# Patient Record
Sex: FEMALE | Race: WHITE | Employment: FULL TIME | URBAN - METROPOLITAN AREA
[De-identification: names, ages, dates, MRNs, and addresses within clinical notes are randomized per-mention and may not be internally consistent; named-entity substitution may affect disease eponyms.]

---

## 2018-09-20 ENCOUNTER — HOSPITAL ENCOUNTER (OUTPATIENT)
Dept: LAB | Age: 23
Discharge: HOME | End: 2018-09-20
Payer: COMMERCIAL

## 2018-09-20 LAB — TSH SERPL DL<=0.005 MIU/L-ACNC: 3.13 UIU/ML

## 2018-09-20 PROCEDURE — 80323 ALKALOIDS NOS: CPT

## 2018-09-20 PROCEDURE — 84443 ASSAY THYROID STIM HORMONE: CPT

## 2018-09-20 PROCEDURE — 36415 COLL VENOUS BLD VENIPUNCTURE: CPT

## 2018-09-20 PROCEDURE — 82652 VIT D 1 25-DIHYDROXY: CPT

## 2018-09-21 LAB — 1,25(OH)2D3 SERPL-MCNC: 51.1 PG/ML (ref 19.9–79.3)

## 2018-09-23 LAB
COTININE SERPL-MCNC: NORMAL NG/ML
NICOTINE SERPL-MCNC: NORMAL NG/ML

## 2018-11-30 ENCOUNTER — HOSPITAL ENCOUNTER (OUTPATIENT)
Dept: SURGERY | Age: 23
Discharge: HOME | End: 2018-11-30
Attending: SURGERY
Payer: COMMERCIAL

## 2018-11-30 VITALS
WEIGHT: 252.2 LBS | HEIGHT: 62 IN | TEMPERATURE: 98 F | RESPIRATION RATE: 16 BRPM | OXYGEN SATURATION: 97 % | HEART RATE: 76 BPM | BODY MASS INDEX: 46.41 KG/M2 | SYSTOLIC BLOOD PRESSURE: 146 MMHG | DIASTOLIC BLOOD PRESSURE: 86 MMHG

## 2018-11-30 LAB
ALBUMIN SERPL-MCNC: 3.6 G/DL (ref 3.5–5)
ALBUMIN/GLOB SERPL: 0.9 {RATIO}
ALP SERPL-CCNC: 73 U/L (ref 50–136)
ALT SERPL-CCNC: 34 U/L (ref 12–65)
ANION GAP SERPL CALC-SCNC: 9 MMOL/L
AST SERPL-CCNC: 22 U/L (ref 15–37)
BILIRUB SERPL-MCNC: 0.3 MG/DL (ref 0.2–1.1)
BUN SERPL-MCNC: 11 MG/DL (ref 6–23)
CALCIUM SERPL-MCNC: 8.9 MG/DL (ref 8.3–10.4)
CHLORIDE SERPL-SCNC: 106 MMOL/L (ref 98–107)
CO2 SERPL-SCNC: 24 MMOL/L (ref 21–32)
CREAT SERPL-MCNC: 0.75 MG/DL (ref 0.6–1)
ERYTHROCYTE [DISTWIDTH] IN BLOOD BY AUTOMATED COUNT: 12.5 % (ref 11.9–14.6)
EST. AVERAGE GLUCOSE BLD GHB EST-MCNC: 105 MG/DL
GLOBULIN SER CALC-MCNC: 4.1 G/DL (ref 2.3–3.5)
GLUCOSE SERPL-MCNC: 86 MG/DL (ref 65–100)
HBA1C MFR BLD: 5.3 %
HCT VFR BLD AUTO: 39.8 % (ref 35.8–46.3)
HGB BLD-MCNC: 12.6 G/DL (ref 11.7–15.4)
MCH RBC QN AUTO: 28.6 PG (ref 26.1–32.9)
MCHC RBC AUTO-ENTMCNC: 31.7 G/DL (ref 31.4–35)
MCV RBC AUTO: 90.5 FL (ref 79.6–97.8)
NRBC # BLD: 0 K/UL (ref 0–0.2)
PLATELET # BLD AUTO: 384 K/UL (ref 150–450)
PMV BLD AUTO: 10 FL (ref 9.4–12.3)
POTASSIUM SERPL-SCNC: 4.2 MMOL/L (ref 3.5–5.1)
PROT SERPL-MCNC: 7.7 G/DL
RBC # BLD AUTO: 4.4 M/UL (ref 4.05–5.2)
SODIUM SERPL-SCNC: 139 MMOL/L (ref 136–145)
WBC # BLD AUTO: 5.9 K/UL (ref 4.3–11.1)

## 2018-11-30 PROCEDURE — 80053 COMPREHEN METABOLIC PANEL: CPT

## 2018-11-30 PROCEDURE — 85027 COMPLETE CBC AUTOMATED: CPT

## 2018-11-30 PROCEDURE — 83036 HEMOGLOBIN GLYCOSYLATED A1C: CPT

## 2018-11-30 NOTE — PERIOP NOTES
Patient verified name and  Order for consent  found in EHR and matches case posting; patient verified. Type 2 surgery, PAT assessment complete. Labs per surgeon: cbc,cmp,hgb a1c; results pending. Labs per anesthesia protocol: no other labs needed. EKG:  Not done per anesthesia guidelines. Hibiclens and instructions given per hospital policy. Patient provided with and instructed on educational handouts including Guide to Surgery, Pain Management, Hand Hygiene, Blood Transfusion Education, and [place] [facility] Brochure. Patient answered medical/surgical history questions at their best of ability. All prior to admission medications documented in [facility]. Original medication prescription bottle not visualized during patient appointment. Patient instructed to hold all vitamins 7 days prior to surgery and NSAIDS 5 days prior to surgery, patient verbalized understanding. Medications to be held: : none. Patient instructed to continue previous medications as prescribed prior to surgery and to take the following medications the day of surgery according to anesthesia guidelines with a small sip of water: sprintec; use proair inhaler and bring. Gi Elias Patient teach back successful and patient demonstrates knowledge of instructions.

## 2018-12-08 NOTE — H&P
Valri Cockayne, MD  
Physician General Surgery Progress Notes     
Signed Encounter Date:  12/8/18 []Hide copied text []Joy for details History and Physical 
  
Patient: Shaggy Robins MRN: [id number]  SSN:  YOB: 1995  Age: 24 y.o. Sex: female   
  
  
     
PCP: Lance, Not On File Date:  12/8/18 
  
  
  
Shaggy Robins returns to the [facility] after successful completion of our multidisciplinary surgical prep program.  This is her final consultation preparing her for Sleeve Gastrectomy surgery. She presents with a height of 5' 2.5\" (1.588 m) and weight of 254 lb (115.2 kg), giving her a Body mass index is 45.72 kg/m². She has an Ideal body weight of 136 lbs, and excess body weight of 118 lbs. She started our program with a weight of 254 lbs. 
  
She has completed all aspects of our prep program and has been deemed an acceptable candidate for bariatric surgery. 
  
PSYCHOLOGICAL EVALUATION:   Completed with Renee Pineda deeming her and appropriate surgical candidate. 
  
DIETITIAN EVALUATOIN:  Completed with Radha Guallpa deeming her an appropriate surgical candidate. 
  
BARIATRIC LABS:   
  
Component Latest Ref Rng & Units 9/20/2018 Nicotine 
    ng/mL None detected Cotinine 
    ng/mL None detected TSH 
    uIU/mL 3.130 Calcitriol (Vit D 1, 25 di-OH) 19.9 - 79.3 pg/mL 51.1  
  
  
We have reviewed the procedure again today and completed our standard pre op teaching. Her questions were answered and she is ready to schedule surgery. 
  
We have discussed the procedure, diet and exercise regimens, and potential complications of surgery. The patient understands the nature and potential complication of surgery and has the capacity to follow the post operative diet, exercise, and nutritional requirements. After review, she has signed her surgical consent today.   
  
Medical History: Morbid Obesity History of stroke, age 10 Exercise induced asthma  
  
PRIOR WEIGHT LOSS ATTEMPTS:  Dietitian, reduced calorie diets, MD supervised 
  EXERCISE ASSESSMENT:  Walking and jogging 30-45 min 3-4x/week 
  
DENTAL ISSUES:  No dental issues with chewing  
  
PSYCHOSOCIAL: 
She notes she  is single and states main support person is her mom.  She is employed as a pharmacy tech.  Her goal in pursuing surgical weight loss is to improve health. She denies any mental health disorders. She states she is independent in her care, can drive a car, and can ambulate without assistance. 
  
  
HISTORY: 
    
Past Medical History:  
Diagnosis Date  Anxiety    
 ASD (atrial septal defect)    
 Fatigue    
 Headache    
 Morbid obesity ([address]    
 Stroke ([facility] [address] 2001  Swelling of both upper extremities    
  during exercise  
  
  
She denies personal or family history of problems with anesthesia, bleeding. History of a stroke age 10. She denies history of DVT or pulmonary embolism. She denies reflux or dysphagia. 
  
  
     
Past Surgical History:  
Procedure Laterality Date  HX HEENT   2002  
  T&A [facility]  
 HX OTHER SURGICAL   11/01/2001  
  ASD Occluder/Dr. [facility]/[facility]/  
  
Social History  
  
    
Tobacco Use  Smoking status: Never Smoker  Smokeless tobacco: Never Used Substance Use Topics  Alcohol use: No  
 Drug use: No  
  
Family History Problem Relation Age of Onset  Diabetes Mother    
 Hypertension Mother    
 Diabetes Father    
 Hypertension Father    
  
  
MEDICATIONS: 
    
Current Outpatient Medications Medication Sig  
 albuterol (PROAIR HFA) 90 mcg/actuation inhaler Take  by inhalation every four (4) hours as needed for Wheezing.  norgestimate-ethinyl estradiol (SPRINTEC, 28,) 0.25-35 mg-mcg tab Take 1 Tab by mouth daily.  pantoprazole (PROTONIX) 40 mg tablet Take 1 Tab by mouth daily. Indications: gastroesophageal reflux disease  ondansetron (ZOFRAN ODT) 8 mg disintegrating tablet Take 1 Tab by mouth every eight (8) hours as needed for Nausea.  oxyCODONE-acetaminophen (PERCOCET) 5-325 mg per tablet Take 1 Tab by mouth every four (4) hours as needed for Pain (1-2 tablets every 4 hours as needed for pain). Max Daily Amount: 6 Tabs.  
  
No current facility-administered medications for this visit.   
  
  
ALLERGIES: 
No Known Allergies 
  
ROS: The patient has no difficulty with chest pain or shortness of breath. No fever or chills. Comprehensive 13 point review of systems was otherwise unremarkable except as noted above. 
  
PHYSICAL EXAM:  
Visit Vitals BP (!) 157/91 Pulse 90 Ht 5' 2.5\" (1.588 m) Wt 254 lb (115.2 kg) BMI 45.72 kg/m²  
  
  
General: Alert oriented cooperative white female in no acute distress. Eyes: Sclera are clear. Conjunctiva and lids within normal limits. No icterus. Ears and Nose: no gross deformities to visual inspection, gross hearing intact Neck: Supple, trachea midline, no appreciable thyromegaly Resp: No JVD. Breathing is  non-labored. Lungs clear to auscultation without wheezing or rhonchi CV: RRR. No murmurs, rubs or gallops appreciated, Carotid bruits are absent Abd: soft non-tender and non-distended without peritoneal signs. +bs Psych:  Mood and affect appropriate. Short-term memory and understanding intact 
  
  
ASSESSMENT: 
Morbid obesity with a  Body mass index is 45.72 kg/m². ready for a sleeve gastrectomy surgery. 
  
PLAN: 
1.  Schedule for laparoscopic, possible open, Sleeve Gastrectomy, in the near future. 2.  Patient will complete our 2 week pre operative diet. 3.  Bring incentive spirometer( given with our standard instruction to use BID 2 weeks prior to surgery) to hospital on day of surgery. 4.  The patient received prescription to use after surgery for :  Percocet, Zofran, and Protonix. 
  
I went over the risks and benefits with the patient.  For risks I went over problems with bleeding, infection and anesthesia. I also noted potential problems of injury to the esophagus, liver, spleen, stomach, pancreas, small bowel and or colon. I addition, I discussed potential problems of DVT/pulmonary embolism, urinary tract infection, wound infection, myocardial infarction, stroke and the potential need to convert to an open procedure. I quoted a 1% nationwide mortality rate for this procedure. She has signed our extensive consent form which is in the chart.  
Nicolás Redman MD 
  
Date:  12/8/18 
  
  
 
  
  
Electronically signed by Rosanna Sanchez MD at 12/8/18 Note Details Hailey Saez MD File Time 12/8/18 Author Type Physician Status Signed Last  Rosanna Sanchez MD Specialty General Surgery 12/8/18 Revision History Detailed Report Note shared with patient

## 2018-12-10 ENCOUNTER — ANESTHESIA (OUTPATIENT)
Dept: SURGERY | Age: 23
DRG: 621 | End: 2018-12-10
Payer: COMMERCIAL

## 2018-12-10 ENCOUNTER — ANESTHESIA EVENT (OUTPATIENT)
Dept: SURGERY | Age: 23
DRG: 621 | End: 2018-12-10
Payer: COMMERCIAL

## 2018-12-10 ENCOUNTER — HOSPITAL ENCOUNTER (INPATIENT)
Age: 23
LOS: 1 days | Discharge: HOME | DRG: 621 | End: 2018-12-11
Attending: SURGERY | Admitting: SURGERY
Payer: COMMERCIAL

## 2018-12-10 PROBLEM — E66.01 MORBID OBESITY WITH BMI OF 45.0-49.9, ADULT (HCC): Status: ACTIVE | Noted: 2018-12-10

## 2018-12-10 LAB
ANION GAP SERPL CALC-SCNC: 12 MMOL/L
BUN SERPL-MCNC: 7 MG/DL (ref 6–23)
CALCIUM SERPL-MCNC: 8.5 MG/DL (ref 8.3–10.4)
CHLORIDE SERPL-SCNC: 104 MMOL/L (ref 98–107)
CO2 SERPL-SCNC: 21 MMOL/L (ref 21–32)
CREAT SERPL-MCNC: 0.66 MG/DL (ref 0.6–1)
ERYTHROCYTE [DISTWIDTH] IN BLOOD BY AUTOMATED COUNT: 12.2 % (ref 11.9–14.6)
GLUCOSE SERPL-MCNC: 118 MG/DL (ref 65–100)
HCG UR QL: NEGATIVE
HCT VFR BLD AUTO: 39 % (ref 35.8–46.3)
HGB BLD-MCNC: 12.8 G/DL (ref 11.7–15.4)
MAGNESIUM SERPL-MCNC: 2 MG/DL (ref 1.8–2.4)
MCH RBC QN AUTO: 28.6 PG (ref 26.1–32.9)
MCHC RBC AUTO-ENTMCNC: 32.8 G/DL (ref 31.4–35)
MCV RBC AUTO: 87.2 FL (ref 79.6–97.8)
NRBC # BLD: 0 K/UL (ref 0–0.2)
PLATELET # BLD AUTO: 392 K/UL (ref 150–450)
PMV BLD AUTO: 10.6 FL (ref 9.4–12.3)
POTASSIUM SERPL-SCNC: 4 MMOL/L (ref 3.5–5.1)
RBC # BLD AUTO: 4.47 M/UL (ref 4.05–5.2)
SODIUM SERPL-SCNC: 137 MMOL/L (ref 136–145)
TSH SERPL DL<=0.005 MIU/L-ACNC: 0.7 UIU/ML
WBC # BLD AUTO: 13.7 K/UL (ref 4.3–11.1)

## 2018-12-10 PROCEDURE — 77030039266 HC ADH SKN EXOFIN S2SG -A: Performed by: SURGERY

## 2018-12-10 PROCEDURE — 77030009968 HC RELD STPLR ENDOSC J&J -D: Performed by: SURGERY

## 2018-12-10 PROCEDURE — 83735 ASSAY OF MAGNESIUM: CPT

## 2018-12-10 PROCEDURE — 77030007956 HC PCH ENDOSC SPEC COVD -C: Performed by: SURGERY

## 2018-12-10 PROCEDURE — 76060000033 HC ANESTHESIA 1 TO 1.5 HR: Performed by: SURGERY

## 2018-12-10 PROCEDURE — 74011250636 HC RX REV CODE- 250/636: Performed by: ANESTHESIOLOGY

## 2018-12-10 PROCEDURE — 76010000161 HC OR TIME 1 TO 1.5 HR INTENSV-TIER 1: Performed by: SURGERY

## 2018-12-10 PROCEDURE — 0DB64Z3 EXCISION OF STOMACH, PERCUTANEOUS ENDOSCOPIC APPROACH, VERTICAL: ICD-10-PCS | Performed by: SURGERY

## 2018-12-10 PROCEDURE — 77030035048 HC TRCR ENDOSC OPTCL COVD -B: Performed by: SURGERY

## 2018-12-10 PROCEDURE — 77030002933 HC SUT MCRYL J&J -A: Performed by: SURGERY

## 2018-12-10 PROCEDURE — 77030013140 HC MSK NEB VYRM -A

## 2018-12-10 PROCEDURE — 74011250636 HC RX REV CODE- 250/636: Performed by: SURGERY

## 2018-12-10 PROCEDURE — 85027 COMPLETE CBC AUTOMATED: CPT

## 2018-12-10 PROCEDURE — 77030008522 HC TBNG INSUF LAPRO STRY -B: Performed by: SURGERY

## 2018-12-10 PROCEDURE — 80048 BASIC METABOLIC PNL TOTAL CA: CPT

## 2018-12-10 PROCEDURE — 77030035051: Performed by: SURGERY

## 2018-12-10 PROCEDURE — 77030037088 HC TUBE ENDOTRACH ORAL NSL COVD-A: Performed by: ANESTHESIOLOGY

## 2018-12-10 PROCEDURE — 77030034208 HC SLV GASTRCTMY 3D CAL SYS DISP BOEH -C: Performed by: SURGERY

## 2018-12-10 PROCEDURE — 74011250637 HC RX REV CODE- 250/637: Performed by: SURGERY

## 2018-12-10 PROCEDURE — 77030008756 HC TU IRR SUC STRY -B: Performed by: SURGERY

## 2018-12-10 PROCEDURE — 84443 ASSAY THYROID STIM HORMONE: CPT

## 2018-12-10 PROCEDURE — 77030008603 HC TRCR ENDOSC EPATH J&J -C: Performed by: SURGERY

## 2018-12-10 PROCEDURE — C9113 INJ PANTOPRAZOLE SODIUM, VIA: HCPCS | Performed by: SURGERY

## 2018-12-10 PROCEDURE — 77030012341 HC CHMB SPCR OPTC MDI VYRM -A

## 2018-12-10 PROCEDURE — 36415 COLL VENOUS BLD VENIPUNCTURE: CPT

## 2018-12-10 PROCEDURE — 77030018390 HC SPNG HEMSTAT2 J&J -B: Performed by: SURGERY

## 2018-12-10 PROCEDURE — 77030008703 HC TU ET UNCUF COVD -A: Performed by: NURSE ANESTHETIST, CERTIFIED REGISTERED

## 2018-12-10 PROCEDURE — 81025 URINE PREGNANCY TEST: CPT

## 2018-12-10 PROCEDURE — 77030034156 HC DEV TISS ENSEAL G2 STR J&J -F: Performed by: SURGERY

## 2018-12-10 PROCEDURE — 77030039425 HC BLD LARYNG TRULITE DISP TELE -A: Performed by: NURSE ANESTHETIST, CERTIFIED REGISTERED

## 2018-12-10 PROCEDURE — 88305 TISSUE EXAM BY PATHOLOGIST: CPT

## 2018-12-10 PROCEDURE — 93005 ELECTROCARDIOGRAM TRACING: CPT | Performed by: FAMILY MEDICINE

## 2018-12-10 PROCEDURE — 77030027876 HC STPLR ENDOSC FLX PWR J&J -G1: Performed by: SURGERY

## 2018-12-10 PROCEDURE — 77030018836 HC SOL IRR NACL ICUM -A: Performed by: SURGERY

## 2018-12-10 PROCEDURE — 77030035045 HC TRCR ENDOSC VRSPRT BLDLSS COVD -B: Performed by: SURGERY

## 2018-12-10 PROCEDURE — 65270000029 HC RM PRIVATE

## 2018-12-10 PROCEDURE — 77030002912 HC SUT ETHBND J&J -A: Performed by: SURGERY

## 2018-12-10 PROCEDURE — 74011000250 HC RX REV CODE- 250: Performed by: SURGERY

## 2018-12-10 PROCEDURE — 77030020782 HC GWN BAIR PAWS FLX 3M -B: Performed by: ANESTHESIOLOGY

## 2018-12-10 PROCEDURE — 77030025088 HC APPL CLP LIG 1 COVD -B: Performed by: SURGERY

## 2018-12-10 PROCEDURE — 76210000016 HC OR PH I REC 1 TO 1.5 HR: Performed by: SURGERY

## 2018-12-10 PROCEDURE — 74011250636 HC RX REV CODE- 250/636

## 2018-12-10 PROCEDURE — 74011000250 HC RX REV CODE- 250

## 2018-12-10 PROCEDURE — 74011000250 HC RX REV CODE- 250: Performed by: ANESTHESIOLOGY

## 2018-12-10 PROCEDURE — 77030039425 HC BLD LARYNG TRULITE DISP TELE -A: Performed by: ANESTHESIOLOGY

## 2018-12-10 PROCEDURE — 94640 AIRWAY INHALATION TREATMENT: CPT

## 2018-12-10 PROCEDURE — 77030033269 HC SLV COMPR SCD KNE2 CARD -B: Performed by: SURGERY

## 2018-12-10 RX ORDER — ACETAMINOPHEN 10 MG/ML
1000 INJECTION, SOLUTION INTRAVENOUS EVERY 6 HOURS
Status: COMPLETED | OUTPATIENT
Start: 2018-12-10 | End: 2018-12-11

## 2018-12-10 RX ORDER — MIDAZOLAM HYDROCHLORIDE 1 MG/ML
2 INJECTION, SOLUTION INTRAMUSCULAR; INTRAVENOUS
Status: COMPLETED | OUTPATIENT
Start: 2018-12-10 | End: 2018-12-10

## 2018-12-10 RX ORDER — ROCURONIUM BROMIDE 10 MG/ML
INJECTION, SOLUTION INTRAVENOUS AS NEEDED
Status: DISCONTINUED | OUTPATIENT
Start: 2018-12-10 | End: 2018-12-10 | Stop reason: HOSPADM

## 2018-12-10 RX ORDER — SODIUM CHLORIDE 0.9 % (FLUSH) 0.9 %
5-10 SYRINGE (ML) INJECTION AS NEEDED
Status: DISCONTINUED | OUTPATIENT
Start: 2018-12-10 | End: 2018-12-11 | Stop reason: HOSPADM

## 2018-12-10 RX ORDER — LIDOCAINE HYDROCHLORIDE 20 MG/ML
INJECTION, SOLUTION EPIDURAL; INFILTRATION; INTRACAUDAL; PERINEURAL AS NEEDED
Status: DISCONTINUED | OUTPATIENT
Start: 2018-12-10 | End: 2018-12-10 | Stop reason: HOSPADM

## 2018-12-10 RX ORDER — SODIUM CHLORIDE 0.9 % (FLUSH) 0.9 %
5-10 SYRINGE (ML) INJECTION AS NEEDED
Status: DISCONTINUED | OUTPATIENT
Start: 2018-12-10 | End: 2018-12-11

## 2018-12-10 RX ORDER — ONDANSETRON 2 MG/ML
INJECTION INTRAMUSCULAR; INTRAVENOUS AS NEEDED
Status: DISCONTINUED | OUTPATIENT
Start: 2018-12-10 | End: 2018-12-10 | Stop reason: HOSPADM

## 2018-12-10 RX ORDER — ONDANSETRON 2 MG/ML
4 INJECTION INTRAMUSCULAR; INTRAVENOUS
Status: DISCONTINUED | OUTPATIENT
Start: 2018-12-10 | End: 2018-12-11 | Stop reason: HOSPADM

## 2018-12-10 RX ORDER — DEXTROSE 50 % IN WATER (D50W) INTRAVENOUS SYRINGE
25-50 AS NEEDED
Status: DISCONTINUED | OUTPATIENT
Start: 2018-12-10 | End: 2018-12-11 | Stop reason: HOSPADM

## 2018-12-10 RX ORDER — LIDOCAINE HYDROCHLORIDE 10 MG/ML
0.1 INJECTION INFILTRATION; PERINEURAL AS NEEDED
Status: DISCONTINUED | OUTPATIENT
Start: 2018-12-10 | End: 2018-12-10 | Stop reason: HOSPADM

## 2018-12-10 RX ORDER — HYDROMORPHONE HYDROCHLORIDE 2 MG/ML
0.5 INJECTION, SOLUTION INTRAMUSCULAR; INTRAVENOUS; SUBCUTANEOUS
Status: DISCONTINUED | OUTPATIENT
Start: 2018-12-10 | End: 2018-12-11 | Stop reason: HOSPADM

## 2018-12-10 RX ORDER — SCOLOPAMINE TRANSDERMAL SYSTEM 1 MG/1
1 PATCH, EXTENDED RELEASE TRANSDERMAL ONCE
Status: DISCONTINUED | OUTPATIENT
Start: 2018-12-10 | End: 2018-12-11 | Stop reason: HOSPADM

## 2018-12-10 RX ORDER — NEOSTIGMINE METHYLSULFATE 1 MG/ML
INJECTION INTRAVENOUS AS NEEDED
Status: DISCONTINUED | OUTPATIENT
Start: 2018-12-10 | End: 2018-12-10 | Stop reason: HOSPADM

## 2018-12-10 RX ORDER — SODIUM CHLORIDE AND POTASSIUM CHLORIDE .9; .15 G/100ML; G/100ML
100 SOLUTION INTRAVENOUS CONTINUOUS
Status: DISCONTINUED | OUTPATIENT
Start: 2018-12-10 | End: 2018-12-11 | Stop reason: HOSPADM

## 2018-12-10 RX ORDER — ATROPINE SULFATE 0.4 MG/ML
INJECTION, SOLUTION ENDOTRACHEAL; INTRAMEDULLARY; INTRAMUSCULAR; INTRAVENOUS; SUBCUTANEOUS AS NEEDED
Status: DISCONTINUED | OUTPATIENT
Start: 2018-12-10 | End: 2018-12-10 | Stop reason: HOSPADM

## 2018-12-10 RX ORDER — ENALAPRILAT 1.25 MG/ML
1.25 INJECTION INTRAVENOUS
Status: DISCONTINUED | OUTPATIENT
Start: 2018-12-10 | End: 2018-12-10

## 2018-12-10 RX ORDER — ACETAMINOPHEN 10 MG/ML
1000 INJECTION, SOLUTION INTRAVENOUS ONCE
Status: COMPLETED | OUTPATIENT
Start: 2018-12-10 | End: 2018-12-10

## 2018-12-10 RX ORDER — DIPHENHYDRAMINE HYDROCHLORIDE 50 MG/ML
12.5 INJECTION, SOLUTION INTRAMUSCULAR; INTRAVENOUS
Status: DISCONTINUED | OUTPATIENT
Start: 2018-12-10 | End: 2018-12-11 | Stop reason: HOSPADM

## 2018-12-10 RX ORDER — DEXTROSE 40 %
1 GEL (GRAM) ORAL AS NEEDED
Status: DISCONTINUED | OUTPATIENT
Start: 2018-12-10 | End: 2018-12-11 | Stop reason: HOSPADM

## 2018-12-10 RX ORDER — SODIUM CHLORIDE 0.9 % (FLUSH) 0.9 %
5-10 SYRINGE (ML) INJECTION EVERY 8 HOURS
Status: DISCONTINUED | OUTPATIENT
Start: 2018-12-10 | End: 2018-12-11

## 2018-12-10 RX ORDER — DEXAMETHASONE SODIUM PHOSPHATE 4 MG/ML
INJECTION, SOLUTION INTRA-ARTICULAR; INTRALESIONAL; INTRAMUSCULAR; INTRAVENOUS; SOFT TISSUE AS NEEDED
Status: DISCONTINUED | OUTPATIENT
Start: 2018-12-10 | End: 2018-12-10 | Stop reason: HOSPADM

## 2018-12-10 RX ORDER — FLUMAZENIL 0.1 MG/ML
0.2 INJECTION INTRAVENOUS
Status: DISCONTINUED | OUTPATIENT
Start: 2018-12-10 | End: 2018-12-10 | Stop reason: HOSPADM

## 2018-12-10 RX ORDER — METOPROLOL TARTRATE 5 MG/5ML
1.25 INJECTION INTRAVENOUS
Status: DISCONTINUED | OUTPATIENT
Start: 2018-12-10 | End: 2018-12-11 | Stop reason: HOSPADM

## 2018-12-10 RX ORDER — BUPIVACAINE HYDROCHLORIDE 5 MG/ML
INJECTION, SOLUTION EPIDURAL; INTRACAUDAL AS NEEDED
Status: DISCONTINUED | OUTPATIENT
Start: 2018-12-10 | End: 2018-12-10 | Stop reason: HOSPADM

## 2018-12-10 RX ORDER — NALOXONE HYDROCHLORIDE 0.4 MG/ML
0.4 INJECTION, SOLUTION INTRAMUSCULAR; INTRAVENOUS; SUBCUTANEOUS AS NEEDED
Status: DISCONTINUED | OUTPATIENT
Start: 2018-12-10 | End: 2018-12-11 | Stop reason: HOSPADM

## 2018-12-10 RX ORDER — KETOROLAC TROMETHAMINE 30 MG/ML
30 INJECTION, SOLUTION INTRAMUSCULAR; INTRAVENOUS
Status: DISCONTINUED | OUTPATIENT
Start: 2018-12-10 | End: 2018-12-11 | Stop reason: HOSPADM

## 2018-12-10 RX ORDER — ONDANSETRON 2 MG/ML
4 INJECTION INTRAMUSCULAR; INTRAVENOUS ONCE
Status: DISCONTINUED | OUTPATIENT
Start: 2018-12-10 | End: 2018-12-10 | Stop reason: HOSPADM

## 2018-12-10 RX ORDER — ALBUTEROL SULFATE 2.5 MG/.5ML
2.5 SOLUTION RESPIRATORY (INHALATION)
Status: DISCONTINUED | OUTPATIENT
Start: 2018-12-10 | End: 2018-12-10 | Stop reason: ALTCHOICE

## 2018-12-10 RX ORDER — DIPHENHYDRAMINE HYDROCHLORIDE 50 MG/ML
12.5 INJECTION, SOLUTION INTRAMUSCULAR; INTRAVENOUS
Status: DISCONTINUED | OUTPATIENT
Start: 2018-12-10 | End: 2018-12-10 | Stop reason: HOSPADM

## 2018-12-10 RX ORDER — ALBUTEROL SULFATE 90 UG/1
2 AEROSOL, METERED RESPIRATORY (INHALATION)
Status: DISCONTINUED | OUTPATIENT
Start: 2018-12-10 | End: 2018-12-11 | Stop reason: HOSPADM

## 2018-12-10 RX ORDER — KETOROLAC TROMETHAMINE 30 MG/ML
INJECTION, SOLUTION INTRAMUSCULAR; INTRAVENOUS AS NEEDED
Status: DISCONTINUED | OUTPATIENT
Start: 2018-12-10 | End: 2018-12-10 | Stop reason: HOSPADM

## 2018-12-10 RX ORDER — LORAZEPAM 2 MG/ML
1 INJECTION INTRAMUSCULAR
Status: DISCONTINUED | OUTPATIENT
Start: 2018-12-10 | End: 2018-12-11 | Stop reason: HOSPADM

## 2018-12-10 RX ORDER — SODIUM CHLORIDE, SODIUM LACTATE, POTASSIUM CHLORIDE, CALCIUM CHLORIDE 600; 310; 30; 20 MG/100ML; MG/100ML; MG/100ML; MG/100ML
100 INJECTION, SOLUTION INTRAVENOUS CONTINUOUS
Status: DISCONTINUED | OUTPATIENT
Start: 2018-12-10 | End: 2018-12-10 | Stop reason: HOSPADM

## 2018-12-10 RX ORDER — PROPOFOL 10 MG/ML
INJECTION, EMULSION INTRAVENOUS AS NEEDED
Status: DISCONTINUED | OUTPATIENT
Start: 2018-12-10 | End: 2018-12-10 | Stop reason: HOSPADM

## 2018-12-10 RX ORDER — NORGESTIMATE AND ETHINYL ESTRADIOL 0.25-0.035
1 KIT ORAL DAILY
Status: DISCONTINUED | OUTPATIENT
Start: 2018-12-11 | End: 2018-12-11 | Stop reason: HOSPADM

## 2018-12-10 RX ORDER — SODIUM CHLORIDE, SODIUM LACTATE, POTASSIUM CHLORIDE, CALCIUM CHLORIDE 600; 310; 30; 20 MG/100ML; MG/100ML; MG/100ML; MG/100ML
75 INJECTION, SOLUTION INTRAVENOUS CONTINUOUS
Status: DISCONTINUED | OUTPATIENT
Start: 2018-12-10 | End: 2018-12-10 | Stop reason: HOSPADM

## 2018-12-10 RX ORDER — ENOXAPARIN SODIUM 100 MG/ML
40 INJECTION SUBCUTANEOUS EVERY 12 HOURS
Status: DISCONTINUED | OUTPATIENT
Start: 2018-12-10 | End: 2018-12-11 | Stop reason: HOSPADM

## 2018-12-10 RX ORDER — SODIUM CHLORIDE 0.9 % (FLUSH) 0.9 %
5-10 SYRINGE (ML) INJECTION EVERY 8 HOURS
Status: DISCONTINUED | OUTPATIENT
Start: 2018-12-10 | End: 2018-12-11 | Stop reason: HOSPADM

## 2018-12-10 RX ORDER — KETOROLAC TROMETHAMINE 30 MG/ML
30 INJECTION, SOLUTION INTRAMUSCULAR; INTRAVENOUS
Status: DISCONTINUED | OUTPATIENT
Start: 2018-12-10 | End: 2018-12-10

## 2018-12-10 RX ORDER — CEFAZOLIN SODIUM/WATER 2 G/20 ML
2 SYRINGE (ML) INTRAVENOUS ONCE
Status: COMPLETED | OUTPATIENT
Start: 2018-12-10 | End: 2018-12-10

## 2018-12-10 RX ORDER — GLYCOPYRROLATE 0.2 MG/ML
INJECTION INTRAMUSCULAR; INTRAVENOUS AS NEEDED
Status: DISCONTINUED | OUTPATIENT
Start: 2018-12-10 | End: 2018-12-10 | Stop reason: HOSPADM

## 2018-12-10 RX ORDER — NALOXONE HYDROCHLORIDE 0.4 MG/ML
0.1 INJECTION, SOLUTION INTRAMUSCULAR; INTRAVENOUS; SUBCUTANEOUS
Status: DISCONTINUED | OUTPATIENT
Start: 2018-12-10 | End: 2018-12-10 | Stop reason: HOSPADM

## 2018-12-10 RX ORDER — FENTANYL CITRATE 50 UG/ML
INJECTION, SOLUTION INTRAMUSCULAR; INTRAVENOUS AS NEEDED
Status: DISCONTINUED | OUTPATIENT
Start: 2018-12-10 | End: 2018-12-10 | Stop reason: HOSPADM

## 2018-12-10 RX ORDER — OXYCODONE HYDROCHLORIDE 5 MG/1
5 TABLET ORAL
Status: DISCONTINUED | OUTPATIENT
Start: 2018-12-10 | End: 2018-12-10 | Stop reason: HOSPADM

## 2018-12-10 RX ORDER — APREPITANT 40 MG/1
40 CAPSULE ORAL ONCE
Status: COMPLETED | OUTPATIENT
Start: 2018-12-10 | End: 2018-12-10

## 2018-12-10 RX ORDER — HYDROMORPHONE HYDROCHLORIDE 2 MG/ML
1 INJECTION, SOLUTION INTRAMUSCULAR; INTRAVENOUS; SUBCUTANEOUS
Status: DISCONTINUED | OUTPATIENT
Start: 2018-12-10 | End: 2018-12-11 | Stop reason: HOSPADM

## 2018-12-10 RX ORDER — SIMETHICONE 80 MG
80 TABLET,CHEWABLE ORAL
Status: DISCONTINUED | OUTPATIENT
Start: 2018-12-10 | End: 2018-12-11 | Stop reason: HOSPADM

## 2018-12-10 RX ORDER — HYDRALAZINE HYDROCHLORIDE 20 MG/ML
5 INJECTION INTRAMUSCULAR; INTRAVENOUS
Status: DISCONTINUED | OUTPATIENT
Start: 2018-12-10 | End: 2018-12-11 | Stop reason: HOSPADM

## 2018-12-10 RX ORDER — ONDANSETRON 8 MG/1
8 TABLET, ORALLY DISINTEGRATING ORAL
Status: DISCONTINUED | OUTPATIENT
Start: 2018-12-10 | End: 2018-12-10 | Stop reason: SDUPTHER

## 2018-12-10 RX ORDER — HYDROMORPHONE HYDROCHLORIDE 2 MG/ML
0.5 INJECTION, SOLUTION INTRAMUSCULAR; INTRAVENOUS; SUBCUTANEOUS
Status: DISCONTINUED | OUTPATIENT
Start: 2018-12-10 | End: 2018-12-10 | Stop reason: HOSPADM

## 2018-12-10 RX ADMIN — MIDAZOLAM 2 MG: 1 INJECTION INTRAMUSCULAR; INTRAVENOUS at 11:09

## 2018-12-10 RX ADMIN — ALBUTEROL SULFATE 2.5 MG: 2.5 SOLUTION RESPIRATORY (INHALATION) at 16:15

## 2018-12-10 RX ADMIN — Medication 10 ML: at 15:13

## 2018-12-10 RX ADMIN — Medication 10 ML: at 22:32

## 2018-12-10 RX ADMIN — ACETAMINOPHEN 1000 MG: 10 INJECTION, SOLUTION INTRAVENOUS at 19:20

## 2018-12-10 RX ADMIN — SODIUM CHLORIDE AND POTASSIUM CHLORIDE 100 ML/HR: .9; .15 SOLUTION INTRAVENOUS at 15:20

## 2018-12-10 RX ADMIN — HYDROMORPHONE HYDROCHLORIDE 0.5 MG: 2 INJECTION, SOLUTION INTRAMUSCULAR; INTRAVENOUS; SUBCUTANEOUS at 13:52

## 2018-12-10 RX ADMIN — LIDOCAINE HYDROCHLORIDE 80 MG: 20 INJECTION, SOLUTION EPIDURAL; INFILTRATION; INTRACAUDAL; PERINEURAL at 12:20

## 2018-12-10 RX ADMIN — ONDANSETRON 4 MG: 2 INJECTION INTRAMUSCULAR; INTRAVENOUS at 15:12

## 2018-12-10 RX ADMIN — PROPOFOL 200 MG: 10 INJECTION, EMULSION INTRAVENOUS at 12:20

## 2018-12-10 RX ADMIN — HYDROMORPHONE HYDROCHLORIDE 1 MG: 2 INJECTION, SOLUTION INTRAMUSCULAR; INTRAVENOUS; SUBCUTANEOUS at 19:19

## 2018-12-10 RX ADMIN — ROCURONIUM BROMIDE 40 MG: 10 INJECTION, SOLUTION INTRAVENOUS at 12:20

## 2018-12-10 RX ADMIN — KETOROLAC TROMETHAMINE 30 MG: 30 INJECTION, SOLUTION INTRAMUSCULAR at 22:39

## 2018-12-10 RX ADMIN — ACETAMINOPHEN 1000 MG: 10 INJECTION, SOLUTION INTRAVENOUS at 13:05

## 2018-12-10 RX ADMIN — SODIUM CHLORIDE, SODIUM LACTATE, POTASSIUM CHLORIDE, AND CALCIUM CHLORIDE: 600; 310; 30; 20 INJECTION, SOLUTION INTRAVENOUS at 12:15

## 2018-12-10 RX ADMIN — ROCURONIUM BROMIDE 5 MG: 10 INJECTION, SOLUTION INTRAVENOUS at 13:00

## 2018-12-10 RX ADMIN — Medication 2 G: at 12:15

## 2018-12-10 RX ADMIN — ATROPINE SULFATE 0.4 MG: 0.4 INJECTION, SOLUTION ENDOTRACHEAL; INTRAMEDULLARY; INTRAMUSCULAR; INTRAVENOUS; SUBCUTANEOUS at 12:35

## 2018-12-10 RX ADMIN — FENTANYL CITRATE 50 MCG: 50 INJECTION, SOLUTION INTRAMUSCULAR; INTRAVENOUS at 13:11

## 2018-12-10 RX ADMIN — FAMOTIDINE 20 MG: 10 INJECTION, SOLUTION INTRAVENOUS at 10:22

## 2018-12-10 RX ADMIN — HYDROMORPHONE HYDROCHLORIDE 1 MG: 2 INJECTION, SOLUTION INTRAMUSCULAR; INTRAVENOUS; SUBCUTANEOUS at 15:17

## 2018-12-10 RX ADMIN — GLYCOPYRROLATE 0.4 MG: 0.2 INJECTION INTRAMUSCULAR; INTRAVENOUS at 13:13

## 2018-12-10 RX ADMIN — NEOSTIGMINE METHYLSULFATE 1 MG: 1 INJECTION INTRAVENOUS at 13:15

## 2018-12-10 RX ADMIN — PANTOPRAZOLE SODIUM 40 MG: 40 INJECTION, POWDER, FOR SOLUTION INTRAVENOUS at 15:11

## 2018-12-10 RX ADMIN — DEXAMETHASONE SODIUM PHOSPHATE 10 MG: 4 INJECTION, SOLUTION INTRA-ARTICULAR; INTRALESIONAL; INTRAMUSCULAR; INTRAVENOUS; SOFT TISSUE at 12:22

## 2018-12-10 RX ADMIN — NEOSTIGMINE METHYLSULFATE 3 MG: 1 INJECTION INTRAVENOUS at 13:13

## 2018-12-10 RX ADMIN — FENTANYL CITRATE 100 MCG: 50 INJECTION, SOLUTION INTRAMUSCULAR; INTRAVENOUS at 12:20

## 2018-12-10 RX ADMIN — ONDANSETRON 4 MG: 2 INJECTION INTRAMUSCULAR; INTRAVENOUS at 12:22

## 2018-12-10 RX ADMIN — KETOROLAC TROMETHAMINE 30 MG: 30 INJECTION, SOLUTION INTRAMUSCULAR; INTRAVENOUS at 13:05

## 2018-12-10 RX ADMIN — APREPITANT 40 MG: 40 CAPSULE ORAL at 10:06

## 2018-12-10 RX ADMIN — ONDANSETRON 4 MG: 2 INJECTION INTRAMUSCULAR; INTRAVENOUS at 22:31

## 2018-12-10 RX ADMIN — FENTANYL CITRATE 50 MCG: 50 INJECTION, SOLUTION INTRAMUSCULAR; INTRAVENOUS at 13:14

## 2018-12-10 RX ADMIN — HYDROMORPHONE HYDROCHLORIDE 0.5 MG: 2 INJECTION, SOLUTION INTRAMUSCULAR; INTRAVENOUS; SUBCUTANEOUS at 13:57

## 2018-12-10 NOTE — ANESTHESIA PREPROCEDURE EVALUATION
Anesthetic History No history of anesthetic complications Review of Systems / Medical History Patient summary reviewed and pertinent labs reviewed Pulmonary Asthma (Exercise induced. Well controlled.) Neuro/Psych  
 
 
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 Had ASD repaired at 5years old. GI/Hepatic/Renal 
  
GERD: well controlled Endo/Other Morbid obesity Other Findings Physical Exam 
 
Airway Mallampati: II 
TM Distance: 4 - 6 cm Neck ROM: normal range of motion Mouth opening: Normal 
 
 Cardiovascular Regular rate and rhythm,  S1 and S2 normal,  no murmur, click, rub, or gallop Dental 
No notable dental hx Pulmonary Breath sounds clear to auscultation Abdominal 
GI exam deferred Other Findings Anesthetic Plan ASA: 3 Anesthesia type: general 
 
 
 
 
Induction: Intravenous Anesthetic plan and risks discussed with: Patient and Father

## 2018-12-10 NOTE — ANESTHESIA POSTPROCEDURE EVALUATION
Procedure(s): 
GASTRECTOMY SLEEVE LAPAROSCOPIC. Anesthesia Post Evaluation Patient location during evaluation: PACU Patient participation: complete - patient participated Level of consciousness: awake Pain management: adequate Airway patency: patent Anesthetic complications: no 
Cardiovascular status: acceptable Respiratory status: spontaneous ventilation and acceptable Hydration status: acceptable Visit Vitals /86 Pulse (!) 58 Temp 37 °C (98.6 °F) Resp 15 Ht 5' 2\" (1.575 m) Wt 112.5 kg (248 lb) SpO2 99% Breastfeeding? No  
BMI 45.36 kg/m²

## 2018-12-10 NOTE — PROGRESS NOTES
Shift assessment complete. Respirations present. Even and unlabored. No s/s of distress. Zero c/o pain at this time. Call light within reach. Encouraged patient to call for assistance. Patient verbalizes understanding. See Doc Flowsheet for assessment details. Patient resting in bed. 5 puncture sites to abdomen with steri strips. Abdomen soft. Alert and oriented times 4. SCDs applied bilaterally. Saline well intact to left hand with fluids infusing. Family at bedside. Complaints of indigestion, protonix given . Dilaudid 1 mg given slow iv push per pain of 8 on pain scale. instructions given on incentive spirometer. Will continue to monitor. Blood pressure 171/93 on arrival to floor will recheck again soon.

## 2018-12-10 NOTE — CONSULTS
HOSPITALIST H&P/CONSULT  NAME:  Tyrone Barker   Age:  24 y. o.  :   1995   MRN:   [id number]  PCP: Wayne Hsieh MD  Consulting MD:  Treatment Team: Attending Provider: Jerry Sykes MD; Consulting Provider: Claudia Haro MD  HPI:     This is a 23 YO female patient with a PMH of morbid obesity, patent foramen ovale ( corrected), DVT of retinal vein during her early childhood, asthma who had a gastric bypass surgery today. She has been hypertensive after the procedure and the hospitalist has been consulted for this reason. She remains basically asymptomatic complaining of some abdominal discomfort and \" gas\". Denies severe pain, nausea or vomiting. According to her when she has been in the clinic or urgent care center before, her BP can be high if she is having SOB due to asthma or if she is having pain, otherwise, her BP can be normal.     10 point ROS done and is negative except as noted in HPI. Past Medical History:   Diagnosis Date    Anxiety     ASD (atrial septal defect)     corrective surgery age 5    Asthma     exercise induced: uses MDI as needed    Fatigue     Headache     Morbid obesity ([address]     Stroke ([address]      retinal thrombosis r/t ASD as child    Swelling of both upper extremities     during exercise: pt denies      Past Surgical History:   Procedure Laterality Date    HX HEENT  2002    [facility]&A [facility]    HX OTHER SURGICAL  2001    ASD Occluder/Dr. Headley/[facility]/      Prior to Admission Medications   Prescriptions Last Dose Informant Patient Reported? Taking? albuterol (PROAIR HFA) 90 mcg/actuation inhaler   Yes No   Sig: Take  by inhalation every four (4) hours as needed for Wheezing. norgestimate-ethinyl estradiol ([facility], ,) 0.25-35 mg-mcg tab 12/10/2018 at Unknown time  Yes Yes   Sig: Take 1 Tab by mouth daily.    ondansetron (ZOFRAN ODT) 8 mg disintegrating tablet Not Taking at Unknown time  No No   Sig: Take 1 Tab by mouth every eight (8) hours as needed for Nausea. Patient not taking: Reported on 2018   oxyCODONE-acetaminophen (PERCOCET) 5-325 mg per tablet Not Taking at Unknown time  No No   Sig: Take 1 Tab by mouth every four (4) hours as needed for Pain (1-2 tablets every 4 hours as needed for pain). Max Daily Amount: 6 Tabs. Patient not taking: Reported on 2018   pantoprazole (PROTONIX) 40 mg tablet Not Taking at Unknown time  No No   Sig: Take 1 Tab by mouth daily. Indications: gastroesophageal reflux disease   Patient not taking: Reported on 2018      Facility-Administered Medications: None     Home meds reconciled. No Known Allergies   Social History     Tobacco Use    Smoking status: Never Smoker    Smokeless tobacco: Never Used   Substance Use Topics    Alcohol use: No      Family History   Problem Relation Age of Onset    Diabetes Mother     Hypertension Mother     Diabetes Father     Hypertension Father         There is no immunization history on file for this patient. Objective:     Visit Vitals  BP (!) 151/93 (BP 1 Location: Right arm, BP Patient Position: At rest;Head of bed elevated (Comment degrees)) Comment: RN Notified (Codie FREEMAN)   Pulse 81   Temp 96.7 °F (35.9 °C)   Resp 16   Ht 5' 2\" (1.575 m)   Wt 112.5 kg (248 lb)   SpO2 96%   Breastfeeding? No   BMI 45.36 kg/m²      Temp (24hrs), Av.7 °F (36.5 °C), Min:96.7 °F (35.9 °C), Max:98.6 °F (37 °C)    Oxygen Therapy  O2 Sat (%): 96 % (12/10/18 1621)  Pulse via Oximetry: 96 beats per minute (12/10/18 161)  O2 Device: Room air (12/10/18 1615)  O2 Flow Rate (L/min): 0 l/min (12/10/18 1615)  Physical Exam:  General:    Alert, cooperative, no distress   Head:   NCAT. No obvious deformity  Nose:  Nares normal. No drainage  Lungs:   CTABL. No wheezing/rhonchi/rales  Heart:   RRR. No m/r/g. Abdomen:   Mild abdominal tenderness   Extremities: No cyanosis. Skin:     No rashes or lesions. Not Jaundiced  Neurologic: Moves all extremities.   no gross focal deficits      Data Review:   Recent Results (from the past 24 hour(s))   HCG URINE, QL. - POC    Collection Time: 12/10/18 10:55 AM   Result Value Ref Range    Pregnancy test,urine (POC) NEGATIVE  NEG       Imaging /Procedures /Studies:  I personally reviewed all labs, imaging, and other studies this admission:  CXR reviewed by me. EKG reviewed by me. CXR Results  (Last 48 hours)    None        CT Results  (Last 48 hours)    None          Assessment and Plan: Active Hospital Problems    Diagnosis Date Noted    Morbid obesity with BMI of 45.0-49.9, adult ([facility] [address] 12/10/2018       PLAN:    # hypertension   -This might be reactive hypertension after her surgery. The level of hypertension is not extremely severe and I think is reasonable to manage her with IV prn medication and observe how her BP behaves. If she remains persistently hypertensive she might be started on a low dose of Lisinopril or Norvasc tomorrow. In any case, with her bariatric surgery she will lose weight and her BP will tend to decrease. -IV hydralazine prn for SBP above 160 or DBP above 100   -BMP, TSH and EKG to be checked   -will monitor her VS and clinical progress     # Asthma   -intermittent   -prn nebs with albuterol     # s/p gastric Bypass surgery   -stable     Plan of care discussed with: patient. Thanks for allowing us to participate in the care of this pleasant patient. Will follow case with you.        Signed By: Roger Schofield MD     December 10, 2018

## 2018-12-10 NOTE — PROGRESS NOTES
Patient looks great. Ambulating in hallway with mother. Voided on arrival to floor, 300cc of clear yellow urine. Using incentive spirometer.

## 2018-12-10 NOTE — PROGRESS NOTES
TRANSFER - IN REPORT: 
 
Verbal report received from MARIAMA Whittington(name) on Pooja F Marlee  being received from ER(unit) for routine progression of care Report consisted of patients Situation, Background, Assessment and  
Recommendations(SBAR). Information from the following report(s) Kardex was reviewed with the receiving nurse. Opportunity for questions and clarification was provided. Assessment completed upon patients arrival to unit and care assumed.

## 2018-12-10 NOTE — OP NOTES
Laparascopic Sleeve Gastrectomy Operative Report    Date of Surgery: 12/10/2018     Preoperative Diagnosis: Morbid Obesity with a BMI of 46    Postoperative Diagnosis: Same as the above    Procedure: Procedure(s):  GASTRECTOMY SLEEVE LAPAROSCOPIC     Surgeon(s) and Role:     Lizzy Romero MD - Primary     Anesthesia:  GETA plus local     NAME OF PROCEDURE: LAPAROSCOPIC SLEEVE GASTRECTOMY    ESTIMATED BLOOD LOSS: 25 mL. SPECIMENS: Segment of the stomach. HISTORY: This is a 24 y.o. female who came to the surgical weight loss  clinic at [facility] of her own volition for consideration of bariatric surgery. The patient went to an information session, met with the dietician and psychologist. she came in and I discussed a gastric bypass versus a sleeve gastrectomy with the patient. I recommended a sleeve gastrectomy. The  patient agreed and signed a consent form. For risks, I mentioned risks of bleeding, infection, anesthesia, injury to the esophagus, stomach, small bowel, liver, spleen, large bowel. I also went through risks of myocardial infarction, pneumonia and leak from the staple line of the sleeve gastrectomy. I said that a leak could produce peritonitis, multi-system organ failure, and even death. There is a 1% nationwide mortality rate for this procedure. The patient understood and still wished to proceed. PROCEDURE IN DETAIL: The patient was brought to an operating room at the [address] [place] [address] where general endotracheal anesthesia was administered without complications. She received 2 grams of Ancef as prophylactic antibiotic coverage. The nursing staff applied sequential compression devices and inserted a Neff catheter. The abdomen was prepped and draped in the usual sterile manner. An incision was made superior into the patient's left of the umbilicus overlying the left rectus abdominis muscle.  An Optiview trocar was placed in the peritoneal cavity under direct vision with 0 degree 10 mm scope. Once in the peritoneal cavity, the abdomen was insufflated to 15 mmHg using carbon dioxide gas. The table was placed in reverse Trendelenburg position. A 5 and 15 mm trocar placed in the right upper quadrant under direct vision. A 5 mm trocar was placed in the epigastric region and then removed. Through this tract, a Baljeet liver retractor was placed and used to retract the left lobe of the liver throughout the remainder of the procedure. It was attached to an Omni self-retaining retracting device. A 5 mm trocar was placed in the left upper quadrant to be used for retraction throughout the procedure. We found the pylorus and measured 6 cm proximal to the pylorus in an area along the greater curvature. The nurse anesthetist placed a 40 Fr Visi-G bougie/calibration tube which was manipulated along the lesser curvature. The device was attached to suction which fixed it in place. We made a small aperture with the Enseal Trio device. We then took the gastrocolic ligament from this point all the way up to the left venkatesh of the diaphragm. We cleared off some posterior adhesions with the Enseal device as well. We then used the [place] 60 stapling device. The first cartridge was a black staple cartridge. We were able to push the bougie into the pre-pyloric channel and along the lesser curvature, where it remained. The next  staple cartridge was green followed by blue cartridges until the stomach was completely divided along the calibration tube. We checked the staple line. Some small bleeding points were cauterized using electrocautery with the spatula. The remnant of the stomach had a stitch of 0 Ethibond placed in the antrum area and the excised stomach was then placed into a large [employer]. The Endopouch was closed and brought up through the 15 mm trocar incision which was a enlarged with the electrocautery.  The bag was removed including the excised stomach which was sent to pathology. The 15 mm trocar was returned. We checked the staple line again and there was no evidence of bleeding. The bougie tube was removed and then replaced. It passed easily from the distal esophagus into the proximal stomach and was advanced back into the pre-pyloric channel under direct vision. There was no evidence of any staple line leak or bleeding. There was  evidence of any bleeding along the staple line which was stopped with 10 mm clips and spot cautery. The bougie was removed for the last time. We removed all the trocars under direct vision with no evidence of bleeding from the trocar sites. The fascia of the 15 mm trocar was closed with interrupted sutures of 0 Vicryl. The skin was closed with subcuticular sutures of 4-0 Monocryl. The patient received 30 mL 0.5% Marcaine in 5 mL doses to each incision site. Mastisol and Steri-Strips were placed over the wounds. The patient tolerated procedure well. She was extubated and brought to the recovery room in stable condition.         Carolin Dickey MD

## 2018-12-10 NOTE — PERIOP NOTES
TRANSFER - OUT REPORT: 
 
Verbal report given to receiving nurse Codie(name) on Pooja KELLI Marlee  being transferred to [facility](unit) for routine progression of care Report consisted of patients Situation, Background, Assessment and  
Recommendations(SBAR). Information from the following report(s) OR Summary, Procedure Summary, Intake/Output and MAR was reviewed with the receiving nurse. Opportunity for questions and clarification was provided. Patient transported with: 
 Eyewitness Surveillance

## 2018-12-11 ENCOUNTER — APPOINTMENT (OUTPATIENT)
Dept: GENERAL RADIOLOGY | Age: 23
DRG: 621 | End: 2018-12-11
Attending: SURGERY
Payer: COMMERCIAL

## 2018-12-11 VITALS
RESPIRATION RATE: 16 BRPM | WEIGHT: 248 LBS | HEART RATE: 73 BPM | SYSTOLIC BLOOD PRESSURE: 139 MMHG | OXYGEN SATURATION: 100 % | BODY MASS INDEX: 45.64 KG/M2 | DIASTOLIC BLOOD PRESSURE: 80 MMHG | TEMPERATURE: 98.6 F | HEIGHT: 62 IN

## 2018-12-11 LAB
ATRIAL RATE: 78 BPM
CALCULATED P AXIS, ECG09: 28 DEGREES
CALCULATED R AXIS, ECG10: 7 DEGREES
CALCULATED T AXIS, ECG11: 12 DEGREES
DIAGNOSIS, 93000: NORMAL
P-R INTERVAL, ECG05: 148 MS
Q-T INTERVAL, ECG07: 372 MS
QRS DURATION, ECG06: 88 MS
QTC CALCULATION (BEZET), ECG08: 424 MS
VENTRICULAR RATE, ECG03: 78 BPM

## 2018-12-11 PROCEDURE — 74011250636 HC RX REV CODE- 250/636: Performed by: INTERNAL MEDICINE

## 2018-12-11 PROCEDURE — 74011000250 HC RX REV CODE- 250: Performed by: SURGERY

## 2018-12-11 PROCEDURE — 74011636320 HC RX REV CODE- 636/320: Performed by: SURGERY

## 2018-12-11 PROCEDURE — 74011250636 HC RX REV CODE- 250/636: Performed by: SURGERY

## 2018-12-11 PROCEDURE — 74220 X-RAY XM ESOPHAGUS 1CNTRST: CPT

## 2018-12-11 RX ORDER — ZOLPIDEM TARTRATE 5 MG/1
5 TABLET ORAL
Status: DISCONTINUED | OUTPATIENT
Start: 2018-12-11 | End: 2018-12-11 | Stop reason: HOSPADM

## 2018-12-11 RX ORDER — PANTOPRAZOLE SODIUM 40 MG/1
40 TABLET, DELAYED RELEASE ORAL
Status: DISCONTINUED | OUTPATIENT
Start: 2018-12-12 | End: 2018-12-11 | Stop reason: HOSPADM

## 2018-12-11 RX ORDER — OXYCODONE AND ACETAMINOPHEN 5; 325 MG/1; MG/1
2 TABLET ORAL
Status: DISCONTINUED | OUTPATIENT
Start: 2018-12-11 | End: 2018-12-11 | Stop reason: HOSPADM

## 2018-12-11 RX ORDER — OXYCODONE AND ACETAMINOPHEN 5; 325 MG/1; MG/1
1 TABLET ORAL
Status: DISCONTINUED | OUTPATIENT
Start: 2018-12-11 | End: 2018-12-11 | Stop reason: HOSPADM

## 2018-12-11 RX ADMIN — ONDANSETRON 4 MG: 2 INJECTION INTRAMUSCULAR; INTRAVENOUS at 07:47

## 2018-12-11 RX ADMIN — ENOXAPARIN SODIUM 40 MG: 40 INJECTION SUBCUTANEOUS at 06:02

## 2018-12-11 RX ADMIN — NORGESTIMATE AND ETHINYL ESTRADIOL 1 TABLET: KIT at 07:50

## 2018-12-11 RX ADMIN — PROMETHAZINE HYDROCHLORIDE 12.5 MG: 25 INJECTION INTRAMUSCULAR; INTRAVENOUS at 00:27

## 2018-12-11 RX ADMIN — Medication 10 ML: at 06:02

## 2018-12-11 RX ADMIN — Medication 10 ML: at 06:03

## 2018-12-11 RX ADMIN — DIATRIZOATE MEGLUMINE AND DIATRIZOATE SODIUM 30 ML: 660; 100 LIQUID ORAL; RECTAL at 09:19

## 2018-12-11 RX ADMIN — HYDRALAZINE HYDROCHLORIDE 5 MG: 20 INJECTION INTRAMUSCULAR; INTRAVENOUS at 04:37

## 2018-12-11 RX ADMIN — ACETAMINOPHEN 1000 MG: 10 INJECTION, SOLUTION INTRAVENOUS at 00:27

## 2018-12-11 RX ADMIN — ACETAMINOPHEN 1000 MG: 10 INJECTION, SOLUTION INTRAVENOUS at 06:02

## 2018-12-11 RX ADMIN — PROMETHAZINE HYDROCHLORIDE 12.5 MG: 25 INJECTION INTRAMUSCULAR; INTRAVENOUS at 09:41

## 2018-12-11 RX ADMIN — SODIUM CHLORIDE AND POTASSIUM CHLORIDE 100 ML/HR: .9; .15 SOLUTION INTRAVENOUS at 06:02

## 2018-12-11 RX ADMIN — KETOROLAC TROMETHAMINE 30 MG: 30 INJECTION, SOLUTION INTRAMUSCULAR at 04:37

## 2018-12-11 NOTE — DISCHARGE INSTRUCTIONS
1. Liquid diet with two protein shakes per day until seen for first post-op visit. Try to get at least 60 grams of protein per day. 2. Showering is allowed, but no tub baths or swimming. 3. Drainage is common from the wounds. Change the dressings as needed. Call our office if the wounds become reddened, tender, feel warm to the touch or pus starts to drain from the wound. 4. Take prescribed pain medication as directed, usually Percocet, Dilaudid, Norco or Ultram. Take over the counter medication for minor pain. 5. Ice may be applied intermittently to the surgical site or sites. 6. Call or office, [phone], if problems arise. 7. Follow up in the office at the assigned time. 8. Resume all medications as taken per surgery, unless specifically instructed not to take certain ones. 9. No lifting more than 25 pounds until told otherwise. 10. Driving is allowed 3 days after surgery as long as you feel comfortable enough to drive and have not taken any prescription pain medication prior to driving. 11. Leave Steri-strips in place until seen in the office. If the strips start to curl up, fall off or begin to have an odor, then gently remove the strips.

## 2018-12-11 NOTE — PROGRESS NOTES
Patient resting in bed, IV fluids infusing, bp recheck after prn hydralazine with systolic @ 220, patient notes gas pain relief, admits to passing flatus, voiding well, denies needs @ this time, call light within reach.

## 2018-12-11 NOTE — PROGRESS NOTES
General Surgery Progress Note 12/11/2018 Admit Date: 12/10/2018 Subjective:  
 
Surgery POD #1 No major problems overnight. Patient reports incisional pain. No nausea or vomiting. Objective:  
 
Visit Vitals /84 Pulse 77 Temp 98.6 °F (37 °C) Resp 18 Ht 5' 2\" (1.575 m) Wt 248 lb (112.5 kg) SpO2 99% Breastfeeding? No  
BMI 45.36 kg/m² Intake/Output Summary (Last 24 hours) at 12/11/2018 0782 Last data filed at 12/11/2018 5973 Gross per 24 hour Intake 3987 ml Output 1425 ml Net 2562 ml EXAM:  ABD soft, mild incisional tenderness, active BS'S. Wounds are intact without signs of bleeding or infection. Data Review Recent Results (from the past 24 hour(s)) HCG URINE, QL. - POC Collection Time: 12/10/18 10:55 AM  
Result Value Ref Range Pregnancy test,urine (POC) NEGATIVE  NEG    
CBC W/O DIFF Collection Time: 12/10/18  7:42 PM  
Result Value Ref Range WBC 13.7 (H) 4.3 - 11.1 K/uL  
 RBC 4.47 4.05 - 5.2 M/uL  
 HGB 12.8 11.7 - 15.4 g/dL HCT 39.0 35.8 - 46.3 % MCV 87.2 79.6 - 97.8 FL  
 MCH 28.6 26.1 - 32.9 PG  
 MCHC 32.8 31.4 - 35.0 g/dL  
 RDW 12.2 11.9 - 14.6 % PLATELET 519 068 - 679 K/uL MPV 10.6 9.4 - 12.3 FL ABSOLUTE NRBC 0.00 0.0 - 0.2 K/uL METABOLIC PANEL, BASIC Collection Time: 12/10/18  7:42 PM  
Result Value Ref Range Sodium 137 136 - 145 mmol/L Potassium 4.0 3.5 - 5.1 mmol/L Chloride 104 98 - 107 mmol/L  
 CO2 21 21 - 32 mmol/L Anion gap 12 mmol/L Glucose 118 (H) 65 - 100 mg/dL BUN 7 6 - 23 MG/DL Creatinine 0.66 0.6 - 1.0 MG/DL  
 GFR est AA >60 >60 ml/min/1.73m2 GFR est non-AA >60 ml/min/1.73m2 Calcium 8.5 8.3 - 10.4 MG/DL MAGNESIUM Collection Time: 12/10/18  7:42 PM  
Result Value Ref Range Magnesium 2.0 1.8 - 2.4 mg/dL TSH 3RD GENERATION Collection Time: 12/10/18  7:42 PM  
Result Value Ref Range TSH 0.698 uIU/mL Hospital Problems  Date Reviewed: 11/8/2018 Codes Class Noted POA * (Principal) Morbid obesity with BMI of 45.0-49.9, adult [facility]) ICD-10-CM: E66.01, Z68.42 
ICD-9-CM: 278.01, V85.42  12/10/2018 Unknown 1. Swallow study today. 2. Pain control. 3. IVF'S. 4. Anti-emetics as necessary 5. OOB/IS/Lovenox 6. Further orders after swallow study.  
 
Myranda Duron MD.

## 2018-12-11 NOTE — PROGRESS NOTES
Assessment completed and documented. Lung sounds clear. Heart sounds regular. Bowel sounds hypoactive. Abdomen soft and tender. Patient having gas pain but is up walking to help this. Has not passed gas yet but is burping. Incision sites are clean/dry/intact. Medicated for nausea. Used IS. Currently up in chair. Will continue to monitor with hourly rounding.

## 2018-12-11 NOTE — PROGRESS NOTES
Received bedside report from outgoing RN, patient is sitting up in bed, on RA, IV fluids infusing, patient family @ bedside, patient admits to \"gas\" discomfort, encouraged movement, patient denies needs @ this time, call light within reach.

## 2018-12-11 NOTE — PROGRESS NOTES
Surgery Gastrograffin swallow study shows no evidence of a leak or obstruction. See orders.  
Bailee Danielle MD.

## 2018-12-11 NOTE — PROGRESS NOTES
Hospitalist Progress Note     Admit Date:  12/10/2018  8:51 AM   Name:  Alexa Franks   Age:  24 y. o.  :  1995   MRN:  [id number]   PCP:  Haritha Malik MD  Treatment Team: Attending Provider: Chela Gómez MD; Consulting Provider: Erendira Dumont MD    Subjective:     Ms. Emily Arenas is a 23 yo female with PMH of obesity, PFO s/p closure, DVT retinal vein, asthma s/p gastric bypass with postop elevated BP. She is not on prior antihypertensives. Prn hydralazine added. 18 some troubles with swallowing and sensation of food sticking    Objective:     Patient Vitals for the past 24 hrs:   Temp Pulse Resp BP SpO2   18 0814 98.9 °F (37.2 °C) (!) 104 18 156/85 98 %   18 0610  77  157/84    18 0427 98.6 °F (37 °C) 67 18 (!) 178/97 99 %   18 0013 99.4 °F (37.4 °C) 84 18 142/89 95 %   12/10/18 1932 98.4 °F (36.9 °C) 98 18 144/82 95 %   12/10/18 1729  81  (!) 151/93    12/10/18 1621 96.7 °F (35.9 °C) (!) 118  (!) 163/99 96 %   12/10/18 1615     96 %   12/10/18 1445 97.5 °F (36.4 °C) 62 16 (!) 171/93 94 %   12/10/18 1429  67 15 160/87 97 %   12/10/18 1414  (!) 59 15 159/90 98 %   12/10/18 1359  (!) 58 15 154/86 99 %   12/10/18 1344  (!) 55 14 160/88 98 %   12/10/18 1339  (!) 59 15 161/85 98 %   12/10/18 1334  (!) 55 15 165/84 97 %   12/10/18 1329 98.6 °F (37 °C) (!) 58 15 177/88 99 %     Oxygen Therapy  O2 Sat (%): 98 % (18 0814)  Pulse via Oximetry: 96 beats per minute (12/10/18 1615)  O2 Device: Room air (12/10/18 1615)  O2 Flow Rate (L/min): 0 l/min (12/10/18 1615)    Intake/Output Summary (Last 24 hours) at 2018 1307  Last data filed at 2018 0948  Gross per 24 hour   Intake 3262 ml   Output 1875 ml   Net 1387 ml       *Note that automatically entered I/Os may not be accurate; dependent on patient compliance with collection and accurate  by assistants. General:    Well nourished. Alert. No distress   CV:   RRR.   No murmur, rub, or gallop. No edema  Lungs:   CTAB. No wheezing, rhonchi, or rales. Abdomen:   Soft, nontender, nondistended. Extremities: Warm and dry. Skin:     No rashes or jaundice.    Neuro:  No gross focal deficits    Data Review:  I have reviewed all labs, meds, telemetry events, and studies from the last 24 hours:    Recent Results (from the past 24 hour(s))   CBC W/O DIFF    Collection Time: 12/10/18  7:42 PM   Result Value Ref Range    WBC 13.7 (H) 4.3 - 11.1 K/uL    RBC 4.47 4.05 - 5.2 M/uL    HGB 12.8 11.7 - 15.4 g/dL    HCT 39.0 35.8 - 46.3 %    MCV 87.2 79.6 - 97.8 FL    MCH 28.6 26.1 - 32.9 PG    MCHC 32.8 31.4 - 35.0 g/dL    RDW 12.2 11.9 - 14.6 %    PLATELET 099 718 - 015 K/uL    MPV 10.6 9.4 - 12.3 FL    ABSOLUTE NRBC 0.00 0.0 - 0.2 K/uL   METABOLIC PANEL, BASIC    Collection Time: 12/10/18  7:42 PM   Result Value Ref Range    Sodium 137 136 - 145 mmol/L    Potassium 4.0 3.5 - 5.1 mmol/L    Chloride 104 98 - 107 mmol/L    CO2 21 21 - 32 mmol/L    Anion gap 12 mmol/L    Glucose 118 (H) 65 - 100 mg/dL    BUN 7 6 - 23 MG/DL    Creatinine 0.66 0.6 - 1.0 MG/DL    GFR est AA >60 >60 ml/min/1.73m2    GFR est non-AA >60 ml/min/1.73m2    Calcium 8.5 8.3 - 10.4 MG/DL   MAGNESIUM    Collection Time: 12/10/18  7:42 PM   Result Value Ref Range    Magnesium 2.0 1.8 - 2.4 mg/dL   TSH 3RD GENERATION    Collection Time: 12/10/18  7:42 PM   Result Value Ref Range    TSH 0.698 uIU/mL   EKG, 12 LEAD, INITIAL    Collection Time: 12/11/18  8:30 AM   Result Value Ref Range    Ventricular Rate 78 BPM    Atrial Rate 78 BPM    P-R Interval 148 ms    QRS Duration 88 ms    Q-T Interval 372 ms    QTC Calculation (Bezet) 424 ms    Calculated P Axis 28 degrees    Calculated R Axis 7 degrees    Calculated T Axis 12 degrees    Diagnosis       Normal sinus rhythm with sinus arrhythmia  Normal ECG  No previous ECGs available  Confirmed by JOVITA PATEL (), [place] ([id number]) on 12/11/2018 12:21:36 PM          All Micro Results     None No results found for this visit on 12/10/18.     Current Meds:  Current Facility-Administered Medications   Medication Dose Route Frequency    oxyCODONE-acetaminophen (PERCOCET) 5-325 mg per tablet 2 Tab  2 Tab Oral Q4H PRN    oxyCODONE-acetaminophen (PERCOCET) 5-325 mg per tablet 1 Tab  1 Tab Oral Q4H PRN    [START ON 12/12/2018] pantoprazole (PROTONIX) tablet 40 mg  40 mg Oral ACB    zolpidem (AMBIEN) tablet 5 mg  5 mg Oral QHS PRN    scopolamine (TRANSDERM-SCOP) 1 mg over 3 days 1 Patch  1 Patch TransDERmal ONCE    norgestimate-ethinyl estradiol (ORTHO-CYCLEN, SPRINTEC) 0.25-35 mg-mcg per tablet 1 Tab (Patient Supplied)  1 Tab Oral DAILY    0.9% sodium chloride with KCl 20 mEq/L infusion  100 mL/hr IntraVENous CONTINUOUS    sodium chloride (NS) flush 5-10 mL  5-10 mL IntraVENous Q8H    sodium chloride (NS) flush 5-10 mL  5-10 mL IntraVENous PRN    naloxone (NARCAN) injection 0.4 mg  0.4 mg IntraVENous PRN    diphenhydrAMINE (BENADRYL) injection 12.5 mg  12.5 mg IntraVENous Q4H PRN    LORazepam (ATIVAN) injection 1 mg  1 mg IntraVENous Q6H PRN    dextrose 40% (GLUTOSE) oral gel 1 Tube  1 Tube Oral PRN    glucagon (GLUCAGEN) injection 1 mg  1 mg IntraMUSCular PRN    dextrose (D50W) injection syrg 12.5-25 g  25-50 mL IntraVENous PRN    enoxaparin (LOVENOX) injection 40 mg  40 mg SubCUTAneous Q12H    promethazine (PHENERGAN) with saline injection 12.5 mg  12.5 mg IntraVENous Q6H PRN    ondansetron (ZOFRAN) injection 4 mg  4 mg IntraVENous Q6H PRN    sodium chloride 0.9 % bolus infusion 1,000 mL  1,000 mL IntraVENous PRN    HYDROmorphone (PF) (DILAUDID) injection 0.5 mg  0.5 mg IntraVENous Q1H PRN    HYDROmorphone (PF) (DILAUDID) injection 1 mg  1 mg IntraVENous Q1H PRN    metoprolol (LOPRESSOR) injection 1.25 mg  1.25 mg IntraVENous Q4H PRN    simethicone (MYLICON) tablet 80 mg  80 mg Oral QID PRN    ketorolac (TORADOL) injection 30 mg  30 mg IntraVENous Q6H PRN    albuterol (PROVENTIL HFA, VENTOLIN HFA, PROAIR HFA) inhaler 2 Puff (Patient Supplied)  2 Puff Inhalation Q6H PRN    hydrALAZINE (APRESOLINE) 20 mg/mL injection 5 mg  5 mg IntraVENous Q6H PRN       Other Studies (last 24 hours):  Xr Gastrograffin Swallow    Result Date: 12/11/2018  GASTROGRAFIN SWALLOW status post bariatric surgery. HISTORY: Recent gastric bypass surgery. TECHNIQUE: Single 30cc swallow of  full strength Gastrografin was performed under direct fluoroscopic observation. FINDINGS:  Patient swallowed without difficulty. There are some delayed gastric emptying. There is a narrow sleeve identified. Contrast flowed into the proximal small bowel. No extravasation. There are surgical clips. Fluoroscopy time: 0.6 minutes. Static images: 5. IMPRESSION: Negative for leak, status post sleeve gastrectomy.        Assessment and Plan:     Hospital Problems as of 12/11/2018 Date Reviewed: 11/8/2018          Codes Class Noted - Resolved POA    * (Principal) Morbid obesity with BMI of 45.0-49.9, adult [facility] ICD-10-CM: E66.01, Z68.42  ICD-9-CM: 278.01, V85.42  12/10/2018 - Present Unknown              Plan:  · Elevated BP: no current chronic meds, will continue to monitor, prn hydralazine as needed   · Asthma: stable, prn albuterol    DC planning/Dispo:    Diet:  DIET BARIATRIC  DVT ppx:  lovenox    Signed:  Garner Nissen, MD

## 2018-12-11 NOTE — PROGRESS NOTES
12/11/18 5367 Incentive Spirometry Treatment Level of Service Independent Actual Volume (ml) 2500 ml Pulmonary Toilet Pulmonary Toilet Incentive Spirometry

## 2018-12-11 NOTE — PROGRESS NOTES
Bariatric Surgery Progress Note    12/11/2018    Admit Date: 12/10/2018    Subjective:     Surgery POD #1  Patient working on sipping clear liquids. States wants to go home. Patient in bed in poor position, states has nausea and epigastric discomfort with sips of apple juice. Instructed to get up in straight chair and sit straight up to see if this helps alleviate discomfort. Discussed that we need to make sure she is tolerating clears before discharging home- she will work on better position for drinking. Objective:     Visit Vitals  /85 (BP 1 Location: Right arm, BP Patient Position: At rest;Sitting)   Pulse (!) 104   Temp 98.9 °F (37.2 °C)   Resp 18   Ht 5' 2\" (1.575 m)   Wt 112.5 kg (248 lb)   SpO2 98%   Breastfeeding?  No   BMI 45.36 kg/m²         Intake/Output Summary (Last 24 hours) at 12/11/2018 1220  Last data filed at 12/11/2018 0948  Gross per 24 hour   Intake 3262 ml   Output 1875 ml   Net 1387 ml            Data Review    Recent Results (from the past 24 hour(s))   CBC W/O DIFF    Collection Time: 12/10/18  7:42 PM   Result Value Ref Range    WBC 13.7 (H) 4.3 - 11.1 K/uL    RBC 4.47 4.05 - 5.2 M/uL    HGB 12.8 11.7 - 15.4 g/dL    HCT 39.0 35.8 - 46.3 %    MCV 87.2 79.6 - 97.8 FL    MCH 28.6 26.1 - 32.9 PG    MCHC 32.8 31.4 - 35.0 g/dL    RDW 12.2 11.9 - 14.6 %    PLATELET 792 087 - 308 K/uL    MPV 10.6 9.4 - 12.3 FL    ABSOLUTE NRBC 0.00 0.0 - 0.2 K/uL   METABOLIC PANEL, BASIC    Collection Time: 12/10/18  7:42 PM   Result Value Ref Range    Sodium 137 136 - 145 mmol/L    Potassium 4.0 3.5 - 5.1 mmol/L    Chloride 104 98 - 107 mmol/L    CO2 21 21 - 32 mmol/L    Anion gap 12 mmol/L    Glucose 118 (H) 65 - 100 mg/dL    BUN 7 6 - 23 MG/DL    Creatinine 0.66 0.6 - 1.0 MG/DL    GFR est AA >60 >60 ml/min/1.73m2    GFR est non-AA >60 ml/min/1.73m2    Calcium 8.5 8.3 - 10.4 MG/DL   MAGNESIUM    Collection Time: 12/10/18  7:42 PM   Result Value Ref Range    Magnesium 2.0 1.8 - 2.4 mg/dL   TSH 3RD GENERATION    Collection Time: 12/10/18  7:42 PM   Result Value Ref Range    TSH 0.698 uIU/mL   EKG, 12 LEAD, INITIAL    Collection Time: 12/11/18  8:30 AM   Result Value Ref Range    Ventricular Rate 78 BPM    Atrial Rate 78 BPM    P-R Interval 148 ms    QRS Duration 88 ms    Q-T Interval 372 ms    QTC Calculation (Bezet) 424 ms    Calculated P Axis 28 degrees    Calculated R Axis 7 degrees    Calculated T Axis 12 degrees    Diagnosis       Normal sinus rhythm with sinus arrhythmia  Normal ECG  No previous ECGs available          Hospital Problems  Date Reviewed: 11/8/2018          Codes Class Noted POA    * (Principal) Morbid obesity with BMI of 45.0-49.9, adult ([facility] ICD-10-CM: E66.01, Z68.42  ICD-9-CM: 278.01, V85.42  12/10/2018 Unknown                Reviewed home instructions for hydration, protein intake, and activity restrictions. Reviewed rules for PO intake. Has follow up appointment in office next week.       Samson Self RN

## 2018-12-11 NOTE — PROGRESS NOTES
Nutrition Assessment: Anthropometrics: Ht: 5'2\", Wt: 252#, 229 %IBW, 46.1 BMI Nutrition Diagnosis:  
Altered GI function R/T wt loss surgery as evidenced by s/p VSG. Morbid obesity as evidenced by 229 %IBW and 46.1 BMI. Intervention: 1. Pt visited by bariatric RD. Pt awaiting diet advancement and acknowledges the need for OOB movement. 2. RD answered pt questions and emphasized 1. Hydration 2. Protein 3. Movement. Monitoring and Evaluation: 1. Follow for tolerance of small amounts of non-carbonated, no concentrated sweet clear liquids while admitted. 2. Follow for weight loss per bariatric guidelines. 3. Bariatric RD to f/u as outpatient.   
 
Ludmila Charles RD, LD

## 2018-12-11 NOTE — PROGRESS NOTES
Patient states sitting up straight helped her tolerate apple juice and that her indigestion and nausea is better. Explained that she needed to tolerate some broth before discharging home. Waiting for the kitchen to deliver broth and will re evaluate.

## 2019-02-05 ENCOUNTER — HOSPITAL ENCOUNTER (OUTPATIENT)
Dept: ULTRASOUND IMAGING | Age: 24
Discharge: HOME | End: 2019-02-05
Attending: SURGERY
Payer: COMMERCIAL

## 2019-02-05 ENCOUNTER — APPOINTMENT (OUTPATIENT)
Dept: NUCLEAR MEDICINE | Age: 24
End: 2019-02-05
Attending: SURGERY
Payer: COMMERCIAL

## 2019-02-05 DIAGNOSIS — R10.10 PAIN OF UPPER ABDOMEN: ICD-10-CM

## 2019-02-05 PROCEDURE — 76705 ECHO EXAM OF ABDOMEN: CPT

## 2019-02-12 ENCOUNTER — HOSPITAL ENCOUNTER (OUTPATIENT)
Dept: NUCLEAR MEDICINE | Age: 24
Discharge: HOME | End: 2019-02-12
Attending: SURGERY
Payer: COMMERCIAL

## 2019-02-12 VITALS — BODY MASS INDEX: 39.32 KG/M2 | WEIGHT: 215 LBS

## 2019-02-12 DIAGNOSIS — R10.10 PAIN OF UPPER ABDOMEN: ICD-10-CM

## 2019-02-12 PROCEDURE — 78227 HEPATOBIL SYST IMAGE W/DRUG: CPT

## 2019-02-12 PROCEDURE — 74011250636 HC RX REV CODE- 250/636: Performed by: SURGERY

## 2019-02-12 RX ORDER — SODIUM CHLORIDE 0.9 % (FLUSH) 0.9 %
10 SYRINGE (ML) INJECTION
Status: COMPLETED | OUTPATIENT
Start: 2019-02-12 | End: 2019-02-12

## 2019-02-12 RX ADMIN — SINCALIDE 1.95 MCG: 5 INJECTION, POWDER, LYOPHILIZED, FOR SOLUTION INTRAVENOUS at 13:40

## 2019-02-12 RX ADMIN — Medication 10 ML: at 12:28

## 2022-03-19 PROBLEM — E66.01 MORBID OBESITY WITH BMI OF 45.0-49.9, ADULT (HCC): Status: ACTIVE | Noted: 2018-12-10

## 2022-10-28 ENCOUNTER — TELEPHONE (OUTPATIENT)
Dept: SURGERY | Age: 27
End: 2022-10-28

## 2022-10-28 DIAGNOSIS — Z87.19 HX OF GASTROESOPHAGEAL REFLUX (GERD): Primary | ICD-10-CM

## 2022-10-28 RX ORDER — OMEPRAZOLE 40 MG/1
40 CAPSULE, DELAYED RELEASE ORAL
Qty: 30 CAPSULE | Refills: 2 | Status: SHIPPED | OUTPATIENT
Start: 2022-10-28

## 2022-10-28 NOTE — TELEPHONE ENCOUNTER
Patient called in requesting a prescription for the \"burning sensation\" in her stomach. She doesn't feel much like eating or drinking due to this. States that she has an appt scheduled for December. Patient has not been seen since 2019. Updated pharmacy in chart.

## 2022-11-30 NOTE — PROGRESS NOTES
Bariatric Surgery Follow Up Visit    Patient: Piero Silva MRN: [id number]     YOB: 1995  Age: 28 y.o. Sex: female              PCP: Carmen Chery MD   Date:  12/6/2022    Weight History Graph    Surgeon: Luke Melchor   DOS: 12/10/2018   Procedure: Sleeve   Pre-op weight: 254   Ideal body weight: 136   Excess body weight: 118      Post-Surgical Weight Loss  Date: 12/06/22  Height: 5' 2\" (157.5 cm)  Weight: 218 lb (98.9 kg)  BMI: 39.87  Weight Change: -36 lbs  Total Weight Change: -36 lbs  % EBWL: 31%     4 years post-op visit after a sleeve gastrectomy was done on 12/10/2018. She has gained 52 lbs since her last office visit. Evaluation of Pre Operative Co Morbid Conditions:    None    Clinical Assessment and Physical Exam:    Doing well 4 year post op. She is doing well. She reports that 6 months out from surgery, she got pregnant which slowed down her weight loss journey. 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 She denies abdominal pain, nausea or vomiting. She admits to occasional GERD with greasy or acidic foods, and takes Omeprazole as needed. She reports that her worst symptom is the increased appetite, which is hard to control. Protein:  90+ gms/day  Fluids:    64+ ounces/day  Exercise:  walking 2-3x per week for 30-45 minutes  Denies abdominal pain, vomiting, or nausea. No fever or chills. Incisions healing well. Occasional reflux. Denies dysphagia. Irregular bowel movements, occasional constipation and diarrhea. Tolerating Protein first diet without difficulty. Physical Exam:  BP (!) 146/85   Pulse 88   Ht 5' 2\" (1.575 m)   Wt 218 lb (98.9 kg)   BMI 39.87 kg/m²     General: Alert, oriented, cooperative white female in no acute distress. Eyes: Sclera are clear. Conjunctiva and lids within normal limits. No icterus.   Ears and Nose: no gross deformities to visual inspection, gross hearing intact  Neck: Supple, trachea midline, no appreciable thyromegaly  Resp:  Breathing is  non-labored. Lungs clear to auscultation without wheezing or rhonchi   CV: RRR. No murmurs, rubs or gallops appreciated. Abd: soft, not distended, active BS'S. No tenderness to palpation. Wounds are clean, dry and intact without signs of infection. Discussed the patient's BMI with her. The BMI follow up plan is as follows:  Plan:  Protein first diet  Exercise: increase current routine  Continue appropriate supplements  Consider revision in the future if GERD worsens  Follow up with medical weight loss  Follow up in one year          Kenrick Kirby.  Chuckie Gore MD  12/6/2022

## 2022-12-05 NOTE — PROGRESS NOTES
Claire Bhatt MS, RD, LD  Surgical Weight Loss Dietitian  [address] [place] [address], [address] [place] [address]  [place]  Phone [phone]   Fax [phone]    [facility] NUTRITION REASSESSMENT  Anthropometrics:    Ht: 52, wt: 218#, 160% IBW, 39.87 BMI  Pt has gained 52 lbs since last visit. Pt is 4 years post-op VSG. Pt getting ~90+g protein/d and ~64+oz fluid/d. Pt is eating at least 3x/d. Pt is waiting 30+ minutes after eating to drink liquids. She is drinking water, flavored water. Pt is exercising 2-3d/w for 30-45 mins via walking. Pt is taking MVI and Ca supplements as recommended. No food-related concerns at this time. Pt reports still feeling hungry after 1-2 hours post-meal.    Diet Recall:   Breakfast: eggs and protein shake   Snack: oikos yogurt   Lunch: grilled chicken and veggies   Shake: protein shake   Dinner: chicken stew and crackers   Drinks: water       Nutrition Diagnosis:  Class II obesity R/T excessive energy intake as evidenced by BMI = 39.87 and 160 % IBW.  --ongoing, modified--BMI and %IBW adjusted to reflect weight gain--      Intervention:   Evaluated diet recall and identified modifications. Encouraged lean protein focus  Encouraged incorporation of non-starchy vegetables  Encouraged practice with meal priority; protein first followed by non-starchy vegetables and complex carbohydrates  Encouraged increased fluid intake  Encouraged avoidance of sugary/caffeinated/carbonated beverages  Re-educated pt on mindful eating and encouraged practice  Discussed meal/food ideas on regular bariatric diet. Encouraged continued and increased activity. Encouraged incorporation of cardio/resistance training. Pt goal of walking and weights exercise 3+x/week for 60+ mins. Monitoring and Evaluation:  Monitor for continued safe, supervised weight loss for VSG. Follow for increased activity.    F/U per MD Claire Bhatt, MS, RD, LD

## 2022-12-06 ENCOUNTER — OFFICE VISIT (OUTPATIENT)
Dept: SURGERY | Age: 27
End: 2022-12-06
Payer: COMMERCIAL

## 2022-12-06 VITALS
WEIGHT: 218 LBS | DIASTOLIC BLOOD PRESSURE: 85 MMHG | BODY MASS INDEX: 40.12 KG/M2 | HEIGHT: 62 IN | HEART RATE: 88 BPM | SYSTOLIC BLOOD PRESSURE: 146 MMHG

## 2022-12-06 DIAGNOSIS — Z98.84 S/P LAPAROSCOPIC SLEEVE GASTRECTOMY: ICD-10-CM

## 2022-12-06 DIAGNOSIS — E66.9 OBESITY, CLASS II, BMI 35-39.9: ICD-10-CM

## 2022-12-06 DIAGNOSIS — Z71.82 EXERCISE COUNSELING: ICD-10-CM

## 2022-12-06 DIAGNOSIS — Z71.3 DIETARY COUNSELING: ICD-10-CM

## 2022-12-06 DIAGNOSIS — E66.01 MORBID OBESITY (HCC): Primary | ICD-10-CM

## 2022-12-06 PROCEDURE — APPSS30 APP SPLIT SHARED TIME 16-30 MINUTES: Performed by: PHYSICIAN ASSISTANT

## 2022-12-06 PROCEDURE — 1036F TOBACCO NON-USER: CPT | Performed by: SURGERY

## 2022-12-06 PROCEDURE — G8427 DOCREV CUR MEDS BY ELIG CLIN: HCPCS | Performed by: SURGERY

## 2022-12-06 PROCEDURE — G8484 FLU IMMUNIZE NO ADMIN: HCPCS | Performed by: SURGERY

## 2022-12-06 PROCEDURE — 99213 OFFICE O/P EST LOW 20 MIN: CPT | Performed by: SURGERY

## 2022-12-06 PROCEDURE — G8419 CALC BMI OUT NRM PARAM NOF/U: HCPCS | Performed by: SURGERY

## 2023-04-18 ENCOUNTER — OFFICE VISIT (OUTPATIENT)
Dept: SURGERY | Age: 28
End: 2023-04-18

## 2023-04-18 VITALS
HEIGHT: 62 IN | BODY MASS INDEX: 41.04 KG/M2 | DIASTOLIC BLOOD PRESSURE: 82 MMHG | HEART RATE: 76 BPM | SYSTOLIC BLOOD PRESSURE: 135 MMHG | WEIGHT: 223 LBS

## 2023-04-18 DIAGNOSIS — E66.01 CLASS 3 SEVERE OBESITY DUE TO EXCESS CALORIES WITHOUT SERIOUS COMORBIDITY WITH BODY MASS INDEX (BMI) OF 40.0 TO 44.9 IN ADULT (HCC): ICD-10-CM

## 2023-04-18 DIAGNOSIS — Z98.84 S/P LAPAROSCOPIC SLEEVE GASTRECTOMY: Primary | ICD-10-CM

## 2023-04-18 DIAGNOSIS — E65 ABDOMINAL OBESITY: ICD-10-CM

## 2023-04-18 NOTE — PROGRESS NOTES
Berlin Hilliard MD   Bariatric & Advanced Laparoscopic Surgery & Endoscopy  [address] [facility] [address], [address] [facility]  [place]                                   Office [phone] Fax [phone]        Date of visit: 4/18/2023      History and Physical    Patient: Latia Hussein MRN: [id number]     YOB: 1995  Age: 28 y.o. Sex: female              PCP: Wilson Isbell MD   Pharmacy:   [facility] [address] [place] [address] [phone]  [address] [facility] [address]  Phone: [phone] Fax: [phone]     Date:  4/18/2023      Latia Hussein  who presents to the [facility] for consultation to assist in weight loss. This is her initial consultation preparing her for our multi-disciplinary weight loss program.  She presents with a height of 5' 2\" (1.575 m) and weight of 223 lb (101.2 kg), giving her a Body mass index is 40.79 kg/m². She has an Ideal body weight of 137 lbs, and excess body weight of 86 lbs. Initial Neck circumference - 14.5  Initial Waist circumference - 44.5    Lowest weight 135 lbs  Highest weight 286 lbs  Biggest challenge to weight loss - difficult to keep up when not surrounding by people with same mindset. MEDICAL HISTORY:  Morbid Obesity       Comorbidity Yes or No   Hypertension- how many medications = NA No   Hyperlipidemia No   Diabetes Mellitus  Insulin dependent = NA  Last A1c = ? No   Coronary Artery Disease No   Gastroesophageal Reflux  Treatment Med = Omeprazole PRN Yes   Obstructive Sleep Apnea No   Cancer No   Asthma Yes - Albuterol inhaler PRN   Osteoarthritis No   Joint Pain No      She denies dysphagia.        Other Yes or No   Migraines Yes   Seizures or Epilepsy No   Glaucoma No   Hyperthyroidism No   Irregular Heartbeat or palpitations No   Gastroparesis No   History of gallstones or pancreatitis No   Malabsorption No   Kidney Stones No       Venous

## 2023-04-18 NOTE — PROGRESS NOTES
Gino Barragan MS, RD, LD  Surgical Weight Loss Dietitian  [address] [facility] [address], [address], [place]  Phone [phone]   Fax [phone]    Obesity Medicine Initial Nutrition Assessment     Assessment:  Pt presents for consideration of obesity medicine program.  Assessment completed with Dr. Leland Whaley. Anthropometrics:    Initial Consult Date 4/18/2023   Initial Height 5'2\"   Initial Weight 223lb   IBW 136lb   EBW 87lb   Initial Neck 14.5\"   Initial Waist 44.5\"   Initial %BF Not assessed today   REE N/a     Intervention:   Provided program manual and instructed to read before follow-up diet visit. Encouraged tracking nutrition intake through a food journal or digital tracking application. Monitoring and Evaluation:  Monitor for continued safe, supervised weight loss for OM   Monitor for labs, EKG.   F/U per MD Gino Barragan MS, RD, LD

## 2023-04-24 ENCOUNTER — TELEMEDICINE (OUTPATIENT)
Dept: SURGERY | Age: 28
End: 2023-04-24

## 2023-04-24 DIAGNOSIS — Z71.3 NUTRITIONAL COUNSELING: Primary | ICD-10-CM

## 2023-04-24 NOTE — PROGRESS NOTES
Ledy Castillo, MS, RD, LD  Surgical Weight Loss Dietitian  [address] [place] [address], [address] [place] [address], [place] [address]  Phone [phone]   Fax [phone]    Obesity Medicine Initial Nutrition Assessment     Assessment:  Pt presents for consideration of obesity medicine program. Hx of VSG. She reports good dietary compliance since last office visit. Pt getting 50g protein/d and 64+oz fluid/d. Pt is eating at least 3x/d. She reports good compliance with mindful eating behaviors. Pt is waiting 30 minutes after eating to drink liquids. She is drinking water, coffee with protein shake or sf creamer. Pt is exercising via gym exercises with weights and cardio 5x/week for 45-60 minutes. Pt is taking MVI and Ca as recommended. Pt reports has read through program binder.      Diet Recall:    Breakfast: 2 eggs with cheese and sriracha   Lunch: none   Snack: apple and peanut butter   Dinner: pork chop and rice   Beverages: water  Fullness level before meals: very hungry to slightly hungry  Fullness level after meals: comfortable to can eat more    Labs:  Hemoglobin A1C (%)   Date Value   04/18/2023 5.5     Vit D, 25-Hydroxy (ng/mL)   Date Value   04/18/2023 14.0 (L)     TSH, 3RD GENERATION (uIU/mL)   Date Value   04/18/2023 1.770         Lipid Panel:  Cholesterol, Total (MG/DL)   Date Value   04/18/2023 189     Triglycerides (MG/DL)   Date Value   04/18/2023 53     HDL (MG/DL)   Date Value   04/18/2023 58     VLDL Cholesterol Calculated (MG/DL)   Date Value   04/18/2023 10.6     Chol/HDL Ratio ( )   Date Value   04/18/2023 3.3         Comprehensive Metabolic Panel:  Sodium (mmol/L)   Date Value   04/18/2023 142     Potassium (mmol/L)   Date Value   04/18/2023 4.6     Chloride (mmol/L)   Date Value   04/18/2023 113 (H)     CO2 (mmol/L)   Date Value   04/18/2023 25     BUN (MG/DL)   Date Value   04/18/2023 8     Creatinine (MG/DL)   Date Value   04/18/2023 0.70     Glucose (mg/dL)   Date Value   04/18/2023 93     Calcium

## 2023-08-17 DIAGNOSIS — Z87.19 HX OF GASTROESOPHAGEAL REFLUX (GERD): ICD-10-CM

## 2023-08-17 RX ORDER — OMEPRAZOLE 40 MG/1
CAPSULE, DELAYED RELEASE ORAL
Qty: 30 CAPSULE | Refills: 2 | OUTPATIENT
Start: 2023-08-17
